# Patient Record
Sex: MALE | Race: WHITE | NOT HISPANIC OR LATINO | Employment: UNEMPLOYED | URBAN - METROPOLITAN AREA
[De-identification: names, ages, dates, MRNs, and addresses within clinical notes are randomized per-mention and may not be internally consistent; named-entity substitution may affect disease eponyms.]

---

## 2023-11-12 ENCOUNTER — OFFICE VISIT (OUTPATIENT)
Dept: URGENT CARE | Facility: URGENT CARE | Age: 28
End: 2023-11-12
Payer: COMMERCIAL

## 2023-11-12 ENCOUNTER — ANCILLARY PROCEDURE (OUTPATIENT)
Dept: GENERAL RADIOLOGY | Facility: CLINIC | Age: 28
End: 2023-11-12
Attending: PHYSICIAN ASSISTANT
Payer: COMMERCIAL

## 2023-11-12 VITALS
WEIGHT: 249 LBS | HEIGHT: 72 IN | SYSTOLIC BLOOD PRESSURE: 140 MMHG | BODY MASS INDEX: 33.72 KG/M2 | DIASTOLIC BLOOD PRESSURE: 91 MMHG | OXYGEN SATURATION: 98 % | HEART RATE: 83 BPM | TEMPERATURE: 98.3 F

## 2023-11-12 DIAGNOSIS — R05.1 ACUTE COUGH: Primary | ICD-10-CM

## 2023-11-12 DIAGNOSIS — J45.21 MILD INTERMITTENT ASTHMA WITH ACUTE EXACERBATION: ICD-10-CM

## 2023-11-12 PROCEDURE — 71046 X-RAY EXAM CHEST 2 VIEWS: CPT | Mod: TC | Performed by: RADIOLOGY

## 2023-11-12 PROCEDURE — 99204 OFFICE O/P NEW MOD 45 MIN: CPT | Performed by: PHYSICIAN ASSISTANT

## 2023-11-12 RX ORDER — BENZONATATE 100 MG/1
100 CAPSULE ORAL 3 TIMES DAILY PRN
Qty: 30 CAPSULE | Refills: 0 | Status: SHIPPED | OUTPATIENT
Start: 2023-11-12

## 2023-11-12 RX ORDER — INHALER, ASSIST DEVICES
SPACER (EA) MISCELLANEOUS
Qty: 1 EACH | Refills: 0 | Status: SHIPPED | OUTPATIENT
Start: 2023-11-12

## 2023-11-12 RX ORDER — ALBUTEROL SULFATE 90 UG/1
2 AEROSOL, METERED RESPIRATORY (INHALATION) EVERY 4 HOURS PRN
Qty: 18 G | Refills: 0 | Status: SHIPPED | OUTPATIENT
Start: 2023-11-12

## 2023-11-12 ASSESSMENT — ENCOUNTER SYMPTOMS
COUGH: 1
CHEST TIGHTNESS: 0
FEVER: 0
RHINORRHEA: 1
WHEEZING: 1
CHILLS: 0
SHORTNESS OF BREATH: 1

## 2023-11-12 NOTE — PATIENT INSTRUCTIONS
Cough  Clinical presentation of a viral infection. Cough is due to a viral infection  Inappropriate use of antibiotics for viral infections may lead to adverse reactions and also contribute to development of antibiotic resistance    Albuterol  Use every 4-6 hours as needed for wheezing and shortness of breath  It is best to stand up, and tilt your chin up  If you feel you are not getting in the medication in effectively consider purchasing a 'space chamber' to help you take in your medication.   -These are sold at pharmacies for $10-15 and can be used for many years

## 2023-11-12 NOTE — PROGRESS NOTES
Assessment & Plan        1. Acute cough    -viral cough  -No evidence of pneumonia on the chest X- ray per Radiology report  - XR Chest 2 Views  - benzonatate (TESSALON) 100 MG capsule; Take 1 capsule (100 mg) by mouth 3 times daily as needed for cough  Dispense: 30 capsule; Refill: 0    2. Mild intermittent asthma with acute exacerbation    -Patient advised to use Albuterol inhaler as needed for  wheezing, SOB or cough.   - albuterol (PROAIR HFA/PROVENTIL HFA/VENTOLIN HFA) 108 (90 Base) MCG/ACT inhaler; Inhale 2 puffs into the lungs every 4 hours as needed for shortness of breath, wheezing or cough  Dispense: 18 g; Refill: 0  - spacer (OPTICHAMBER FAUSTINA) holding chamber; Use with the albuterol inhaler  Dispense: 1 each; Refill: 0    Results for orders placed or performed in visit on 11/12/23   XR Chest 2 Views     Status: None    Narrative    EXAM: XR CHEST 2 VIEWS  LOCATION: [facility]  DATE: 11/12/2023    INDICATION:  Acute cough  COMPARISON: None.      Impression    IMPRESSION: Negative chest.       Patient Instructions   Cough  Clinical presentation of a viral infection. Cough is due to a viral infection  Inappropriate use of antibiotics for viral infections may lead to adverse reactions and also contribute to development of antibiotic resistance    Albuterol  Use every 4-6 hours as needed for wheezing and shortness of breath  It is best to stand up, and tilt your chin up  If you feel you are not getting in the medication in effectively consider purchasing a 'space chamber' to help you take in your medication.   -These are sold at pharmacies for $10-15 and can be used for many years      Return if symptoms worsen or fail to improve, for Follow up.    At the end of the encounter, I discussed results, diagnosis, medications. Discussed red flags for immediate return to clinic/ER, as well as indications for follow up if no improvement. Patient understood and agreed to plan. Patient  was stable for discharge.    Subjective     Charlie is a 27 year old male who presents to clinic today for the following health issues:  Chief Complaint   Patient presents with    Urgent Care     Cough after cold x 2-3 weeks; h/o Asthma.     HPI    Patient reports cough for 3 weeks. Patient reports cough is dry, worse during the day. Patient notes runny nose, congestion. No sinus pain or pressure. Patient reports history of Asthma. He does not currently use an inhaler. He denies and chills, chest tightness, chest pain.     Review of Systems   Constitutional:  Negative for chills and fever.   HENT:  Positive for congestion and rhinorrhea.    Respiratory:  Positive for cough, shortness of breath and wheezing. Negative for chest tightness.    Cardiovascular:  Negative for chest pain.       Problem List:  There are no relevant problems documented for this patient.      No past medical history on file.    Social History     Tobacco Use    Smoking status: Never    Smokeless tobacco: Never   Substance Use Topics    Alcohol use: Not on file           Objective    BP (!) 140/91   Pulse 83   Temp 98.3  F (36.8  C) (Oral)   Ht 1.829 m (6')   Wt 112.9 kg (249 lb)   SpO2 98%   BMI 33.77 kg/m    Physical Exam  Constitutional:       Appearance: Normal appearance.   HENT:      Head: Normocephalic.      Mouth/Throat:      Mouth: Mucous membranes are moist.      Pharynx: Oropharynx is clear. Uvula midline. No posterior oropharyngeal erythema.   Cardiovascular:      Rate and Rhythm: Normal rate and regular rhythm.   Pulmonary:      Effort: Pulmonary effort is normal.      Breath sounds: Normal breath sounds.   Lymphadenopathy:      Head:      Right side of head: No submental, submandibular or tonsillar adenopathy.      Left side of head: No submental, submandibular or tonsillar adenopathy.      Cervical: No cervical adenopathy.      Right cervical: No superficial cervical adenopathy.     Left cervical: No superficial cervical  adenopathy.   Skin:     General: Skin is warm and dry.      Findings: No rash.   Neurological:      Mental Status: He is alert.   Psychiatric:         Mood and Affect: Mood normal.         Behavior: Behavior normal.              Latisha Austin PA-C

## 2024-01-07 ENCOUNTER — HEALTH MAINTENANCE LETTER (OUTPATIENT)
Age: 29
End: 2024-01-07

## 2024-06-15 ENCOUNTER — OFFICE VISIT (OUTPATIENT)
Dept: URGENT CARE | Facility: URGENT CARE | Age: 29
End: 2024-06-15
Payer: MEDICAID

## 2024-06-15 VITALS
TEMPERATURE: 98.2 F | HEART RATE: 94 BPM | OXYGEN SATURATION: 95 % | DIASTOLIC BLOOD PRESSURE: 84 MMHG | SYSTOLIC BLOOD PRESSURE: 121 MMHG

## 2024-06-15 DIAGNOSIS — H60.392 ACUTE INFECTION OF LEFT PINNA: ICD-10-CM

## 2024-06-15 DIAGNOSIS — H60.392 INFECTIVE OTITIS EXTERNA, LEFT: Primary | ICD-10-CM

## 2024-06-15 PROCEDURE — 99213 OFFICE O/P EST LOW 20 MIN: CPT | Performed by: PHYSICIAN ASSISTANT

## 2024-06-15 RX ORDER — CIPROFLOXACIN AND DEXAMETHASONE 3; 1 MG/ML; MG/ML
4 SUSPENSION/ DROPS AURICULAR (OTIC) 2 TIMES DAILY
Qty: 7.5 ML | Refills: 0 | Status: SHIPPED | OUTPATIENT
Start: 2024-06-15 | End: 2024-06-22

## 2024-06-15 RX ORDER — LEVOFLOXACIN 500 MG/1
500 TABLET, FILM COATED ORAL DAILY
Qty: 7 TABLET | Refills: 0 | Status: SHIPPED | OUTPATIENT
Start: 2024-06-15 | End: 2024-06-22

## 2024-06-15 RX ORDER — NEOMYCIN SULFATE, POLYMYXIN B SULFATE, HYDROCORTISONE 3.5; 10000; 1 MG/ML; [USP'U]/ML; MG/ML
3 SOLUTION/ DROPS AURICULAR (OTIC) 4 TIMES DAILY
Qty: 10 ML | Refills: 0 | Status: SHIPPED | OUTPATIENT
Start: 2024-06-15 | End: 2024-06-22

## 2024-06-15 ASSESSMENT — ENCOUNTER SYMPTOMS
RHINORRHEA: 0
SORE THROAT: 1
FEVER: 0

## 2024-06-15 NOTE — PATIENT INSTRUCTIONS
Your canal is infected.   Avoid q-tips and use antibiotic drops for 7 days.     You have a mild infection to external part of your ear. This might go away with the drops, but if that worsens you will need an oral antibiotic. I sent this in to your pharmacy if you need it.

## 2024-06-15 NOTE — PROGRESS NOTES
Assessment & Plan:        ICD-10-CM    1. Infective otitis externa, left  H60.392 ciprofloxacin-dexAMETHasone (CIPRODEX) 0.3-0.1 % otic suspension     neomycin-polymyxin-hydrocortisone (CORTISPORIN) 3.5-98656-5 otic solution      2. Acute infection of left pinna  H60.392 levofloxacin (LEVAQUIN) 500 MG tablet            Plan/Clinical Decision Making:    Patient presents with left ear pain with canal swelling and erythema, Has some mild swelling, tenderness and erythema of ilya. Discussed treatment with Ciprodex. Doesn't have insurance at this time. Due to cost sent in cortisporin. Also has some tenderness, redness of ilya area of left ear. Superficial infection at this time and might respond to drops, but if not improving or worsening recommend oral Levaquin for treatment.   Reviewed coupons on [employer].     Patient Instructions   Your canal is infected.   Avoid q-tips and use antibiotic drops for 7 days.     You have a mild infection to external part of your ear. This might go away with the drops, but if that worsens you will need an oral antibiotic. I sent this in to your pharmacy if you need it.         Return if symptoms worsen or fail to improve, for in 2-3 days.     At the end of the encounter, I discussed results, diagnosis, medications. Discussed red flags for immediate return to clinic/ER, as well as indications for follow up if no improvement. Patient understood and agreed to plan. Patient was stable for discharge.        Adalgisa Pulido PA-C on 6/15/2024 at 12:56 PM          Subjective:     HPI:    Charlie is a 28 year old male who presents to clinic today for the following health issues:  Chief Complaint   Patient presents with    Urgent Care     Left ear pain      HPI    Left ear pain started about 3 days ago. Has had some blood and puss.     Review of Systems   Constitutional:  Negative for fever.   HENT:  Positive for ear discharge and sore throat (irritation). Negative for congestion and  rhinorrhea.          There is no problem list on file for this patient.       History reviewed. No pertinent past medical history.    Social History     Tobacco Use    Smoking status: Never    Smokeless tobacco: Never   Substance Use Topics    Alcohol use: Not on file             Objective:     Vitals:    06/15/24 1242   BP: 121/84   BP Location: Right arm   Patient Position: Sitting   Cuff Size: Adult Large   Pulse: 94   Temp: 98.2  F (36.8  C)   TempSrc: Tympanic   SpO2: 95%         Physical Exam   EXAM:   Pleasant, alert, appropriate appearance. NAD.  Head Exam: Normocephalic, atraumatic.  Eye Exam:  non icteric/injection.    Ear Exam: TMs grey without bulging. Left canal with swelling, redness. Has some redness, swelling and tenderness of ilya.  Normal pinna.  Nose Exam: Normal external nose.    OroPharynx Exam:  Moist mucous membranes. No erythema, pharynx without exudate or hypertrophy.  Neck/Thyroid Exam:  No LAD.    Chest/Respiratory Exam: CTAB.  Cardiovascular Exam: RRR.     Results:  No results found for any visits on 06/15/24.

## 2025-01-25 ENCOUNTER — HEALTH MAINTENANCE LETTER (OUTPATIENT)
Age: 30
End: 2025-01-25